# Patient Record
Sex: FEMALE | Race: BLACK OR AFRICAN AMERICAN | NOT HISPANIC OR LATINO | Employment: PART TIME | URBAN - METROPOLITAN AREA
[De-identification: names, ages, dates, MRNs, and addresses within clinical notes are randomized per-mention and may not be internally consistent; named-entity substitution may affect disease eponyms.]

---

## 2020-01-06 PROBLEM — Z86.19 HISTORY OF CHLAMYDIA: Status: ACTIVE | Noted: 2020-01-06

## 2020-01-06 PROBLEM — O28.0 ABNORMAL MATERNAL SERUM SCREENING TEST: Status: ACTIVE | Noted: 2019-09-18

## 2020-01-06 PROBLEM — Z86.19 HISTORY OF GONORRHEA: Status: ACTIVE | Noted: 2020-01-06

## 2020-01-09 PROBLEM — Z34.90 ENCOUNTER FOR INDUCTION OF LABOR: Status: ACTIVE | Noted: 2020-01-09

## 2020-01-10 PROBLEM — Z34.90 ENCOUNTER FOR INDUCTION OF LABOR: Status: RESOLVED | Noted: 2020-01-09 | Resolved: 2020-01-10

## 2020-07-29 ENCOUNTER — NURSE TRIAGE (OUTPATIENT)
Dept: ADMINISTRATIVE | Facility: CLINIC | Age: 25
End: 2020-07-29

## 2020-07-29 NOTE — TELEPHONE ENCOUNTER
Pt escalated in home monitoring queue. Attempted to reach pt 3 times with no success. Unable to leave voicemail due to mailbox not set up. Only one number listed under pt.    Reason for Disposition   Second attempt to contact family AND no contact made. Phone number verified.    Protocols used: NO CONTACT OR DUPLICATE CONTACT CALL-A-[place]

## 2020-12-14 PROBLEM — O28.0 ABNORMAL MATERNAL SERUM SCREENING TEST: Status: RESOLVED | Noted: 2019-09-18 | Resolved: 2020-12-14

## 2021-10-22 PROBLEM — S82.61XA CLOSED FRACTURE OF DISTAL LATERAL MALLEOLUS OF RIGHT ANKLE: Status: ACTIVE | Noted: 2021-10-22

## 2022-01-17 PROBLEM — Z34.91 ENCOUNTER FOR SUPERVISION OF NORMAL PREGNANCY IN FIRST TRIMESTER: Status: ACTIVE | Noted: 2022-01-17
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
## 2022-04-12 PROBLEM — R03.0 ELEVATED BLOOD PRESSURE READING: Status: ACTIVE | Noted: 2022-04-12

## 2022-04-12 PROBLEM — Z34.91 ENCOUNTER FOR SUPERVISION OF NORMAL PREGNANCY IN FIRST TRIMESTER: Status: RESOLVED | Noted: 2022-01-17 | Resolved: 2022-04-12

## 2022-05-26 DIAGNOSIS — U07.1 COVID-19 VIRUS DETECTED: ICD-10-CM

## 2022-05-27 ENCOUNTER — NURSE TRIAGE (OUTPATIENT)
Dept: ADMINISTRATIVE | Facility: CLINIC | Age: 27
End: 2022-05-27

## 2022-06-03 PROBLEM — Z3A.31 31 WEEKS GESTATION OF PREGNANCY: Status: ACTIVE | Noted: 2022-06-03

## 2022-06-03 PROBLEM — U07.1 COVID-19 AFFECTING PREGNANCY IN THIRD TRIMESTER: Status: ACTIVE | Noted: 2022-06-03

## 2022-06-03 PROBLEM — O98.513 COVID-19 AFFECTING PREGNANCY IN THIRD TRIMESTER: Status: ACTIVE | Noted: 2022-06-03

## 2022-06-16 PROBLEM — Z3A.33 33 WEEKS GESTATION OF PREGNANCY: Status: ACTIVE | Noted: 2022-06-03

## 2022-07-21 PROBLEM — Z3A.38 38 WEEKS GESTATION OF PREGNANCY: Status: ACTIVE | Noted: 2022-06-03

## 2022-07-25 PROBLEM — O99.013 ANEMIA DURING PREGNANCY IN THIRD TRIMESTER: Status: ACTIVE | Noted: 2022-07-25

## 2022-07-25 PROBLEM — R03.0 ELEVATED BLOOD PRESSURE READING: Status: RESOLVED | Noted: 2022-04-12 | Resolved: 2022-07-25

## 2023-04-19 ENCOUNTER — PATIENT MESSAGE (OUTPATIENT)
Dept: RESEARCH | Facility: HOSPITAL | Age: 28
End: 2023-04-19

## 2023-07-03 PROBLEM — Z3A.38 38 WEEKS GESTATION OF PREGNANCY: Status: RESOLVED | Noted: 2022-06-03 | Resolved: 2023-07-03
